# Patient Record
Sex: MALE | Race: WHITE | NOT HISPANIC OR LATINO | Employment: STUDENT | URBAN - METROPOLITAN AREA
[De-identification: names, ages, dates, MRNs, and addresses within clinical notes are randomized per-mention and may not be internally consistent; named-entity substitution may affect disease eponyms.]

---

## 2017-04-15 ENCOUNTER — HOSPITAL ENCOUNTER (EMERGENCY)
Facility: HOSPITAL | Age: 22
Discharge: HOME | End: 2017-04-15
Attending: EMERGENCY MEDICINE | Admitting: EMERGENCY MEDICINE

## 2017-04-15 VITALS
HEIGHT: 75 IN | BODY MASS INDEX: 23.5 KG/M2 | DIASTOLIC BLOOD PRESSURE: 69 MMHG | SYSTOLIC BLOOD PRESSURE: 124 MMHG | WEIGHT: 189 LBS | OXYGEN SATURATION: 98 % | RESPIRATION RATE: 16 BRPM | TEMPERATURE: 97.2 F | HEART RATE: 82 BPM

## 2017-04-15 DIAGNOSIS — S05.01XA CONJUNCTIVAL ABRASION, RIGHT, INITIAL ENCOUNTER: Primary | ICD-10-CM

## 2017-04-15 PROCEDURE — 99283 EMERGENCY DEPT VISIT LOW MDM: CPT

## 2017-04-15 RX ORDER — TETRACAINE HYDROCHLORIDE 5 MG/ML
2 SOLUTION OPHTHALMIC ONCE
Status: COMPLETED | OUTPATIENT
Start: 2017-04-15 | End: 2017-04-15

## 2017-04-15 RX ORDER — GENTAMICIN SULFATE 3 MG/ML
1 SOLUTION/ DROPS OPHTHALMIC 3 TIMES DAILY
Qty: 5 ML | Refills: 0 | Status: SHIPPED | OUTPATIENT
Start: 2017-04-15

## 2017-04-15 RX ADMIN — TETRACAINE HYDROCHLORIDE 2 DROP: 5 SOLUTION OPHTHALMIC at 01:48

## 2017-04-15 NOTE — ED PROVIDER NOTES
Subjective   Patient is a 21 y.o. male presenting with eye problem.   Eye Problem   Location:  Right eye  Quality:  Aching  Severity:  Mild  Onset quality:  Sudden  Duration:  2 hours  Timing:  Intermittent  Progression:  Waxing and waning  Chronicity:  New  Context: direct trauma and scratch    Relieved by:  Nothing  Worsened by:  Nothing  Ineffective treatments:  None tried  Associated symptoms: no blurred vision, no decreased vision, no discharge, no double vision, no facial rash, no headaches, no itching, no nausea, no photophobia, no redness, no scotomas, no swelling and no tearing      21-year-old male presents to emergency department with a scratch to right periorbital area and right I, states was at a friend's house earlier, believes he was scratched or bitten by his friend's dog.  He denies vision changes photophobia decreased visual field.  He does have an excoriation to the lateral canthal area.  Denies other symptoms or injuries.  Tetanus immunization is up-to-date.  Review of Systems   Eyes: Negative for blurred vision, double vision, photophobia, discharge, redness and itching.        Right periorbital injury per history of present illness   Gastrointestinal: Negative for nausea.   Neurological: Negative for headaches.   All other systems reviewed and are negative.      History reviewed. No pertinent past medical history.    No Known Allergies    History reviewed. No pertinent surgical history.    History reviewed. No pertinent family history.    Social History     Social History   • Marital status: Single     Spouse name: N/A   • Number of children: N/A   • Years of education: N/A     Social History Main Topics   • Smoking status: Never Smoker   • Smokeless tobacco: None   • Alcohol use No   • Drug use: No   • Sexual activity: Defer     Other Topics Concern   • None     Social History Narrative   • None           Objective   Physical Exam   Constitutional: He is oriented to person, place, and time. He  appears well-developed and well-nourished. No distress.   HENT:   Head: Normocephalic and atraumatic.   Right Ear: External ear normal.   Left Ear: External ear normal.   Nose: Nose normal.   Mouth/Throat: Oropharynx is clear and moist. No oropharyngeal exudate.   Eyes: EOM are normal. Pupils are equal, round, and reactive to light. Right eye exhibits no discharge. Left eye exhibits no discharge. No scleral icterus.   Right lateral canthal area with superficial excoriations, the right lateral sclera is slightly injected and excoriated, with no obvious defect.  Anterior chambers clear, visual acuity is intact visual fields are grossly full.   Neck: Normal range of motion. Neck supple. No JVD present. No tracheal deviation present. No thyromegaly present.   Cardiovascular: Normal rate, regular rhythm, normal heart sounds and intact distal pulses.  Exam reveals no gallop and no friction rub.    No murmur heard.  Pulmonary/Chest: Effort normal and breath sounds normal. No stridor. No respiratory distress. He has no wheezes. He has no rales. He exhibits no tenderness.   Abdominal: Soft. Bowel sounds are normal. He exhibits no distension. There is no tenderness. There is no rebound and no guarding.   Musculoskeletal: Normal range of motion. He exhibits no edema, tenderness or deformity.   Neurological: He is alert and oriented to person, place, and time. No cranial nerve deficit. He exhibits normal muscle tone. Coordination normal.   Skin: Skin is warm and dry. No rash noted. He is not diaphoretic. No erythema. No pallor.   Psychiatric: He has a normal mood and affect. His behavior is normal. Judgment and thought content normal.   Nursing note and vitals reviewed.      Procedures        No results found for this or any previous visit (from the past 24 hour(s)).  Note: In addition to lab results from this visit, the labs listed above may include labs taken at another facility or during a different encounter within the last  "24 hours. Please correlate lab times with ED admission and discharge times for further clarification of the services performed during this visit.    No orders to display     Vitals:    04/15/17 0050 04/15/17 0211   BP: 122/77 124/69   BP Location:  Right arm   Patient Position:  Sitting   Pulse: 82 82   Resp: 16    Temp: 98.1 °F (36.7 °C) 97.2 °F (36.2 °C)   SpO2: 97% 98%   Weight: 189 lb (85.7 kg)    Height: 75\" (190.5 cm)      Medications   tetracaine (ALTACAINE) 0.5 % ophthalmic solution 2 drop (2 drops Left Eye Given 4/15/17 0148)     ECG/EMG Results (last 24 hours)     ** No results found for the last 24 hours. **          ED Course  ED Course   Comment By Time   Procedure note: Fluoroscopy scene stain/was lamp examination, right eye: Patient's right eye was anesthetized with tetracaine drops, fluorescein applied and eye examined under Woods lamp, no fluorescein uptake is noted. Eliseo Krishnamurthy PA-C 04/15 8443   Will treat with gentamicin ophthalmic drops, follow-up with ophthalmology, keep.  Oral area clean and dry thin layer of bacitracin once or twice daily after cleansing with antibacterial soap and water.  He voices understanding and is agreeable to plan. Eliseo Krishnamurthy PA-C 04/15 [id number]                  [facility]    Final diagnoses:   Conjunctival abrasion, right, initial encounter            Eliseo Krishnamurthy PA-C  04/15/17 0421    "

## 2017-04-15 NOTE — DISCHARGE INSTRUCTIONS
Follow-up with Dr. Lopez for ophthalmological evaluation.  Return to emergency department if any change or worsening of symptoms.